# Patient Record
Sex: MALE | HISPANIC OR LATINO | URBAN - METROPOLITAN AREA
[De-identification: names, ages, dates, MRNs, and addresses within clinical notes are randomized per-mention and may not be internally consistent; named-entity substitution may affect disease eponyms.]

---

## 2023-11-13 ENCOUNTER — EMERGENCY (EMERGENCY)
Facility: HOSPITAL | Age: 23
LOS: 1 days | Discharge: HOME | End: 2023-11-13
Admitting: EMERGENCY MEDICINE
Payer: MEDICAID

## 2023-11-13 VITALS
TEMPERATURE: 98 F | HEART RATE: 85 BPM | SYSTOLIC BLOOD PRESSURE: 111 MMHG | DIASTOLIC BLOOD PRESSURE: 65 MMHG | OXYGEN SATURATION: 99 % | RESPIRATION RATE: 16 BRPM

## 2023-11-13 PROCEDURE — 99283 EMERGENCY DEPT VISIT LOW MDM: CPT

## 2023-11-13 NOTE — ED ADULT NURSE NOTE - OBJECTIVE STATEMENT
Pt is alert and orientedx4, ambulatory at baseline. Pt presents to the ED with B/L ear pain for month, denies hearing loss. Pt denies chest pain, shortness of breath, dyspnea on exertion, breathing is unlabored and even. Pt denies nausea, vomiting or diarrhea

## 2023-11-13 NOTE — ED PROVIDER NOTE - PATIENT PORTAL LINK FT
You can access the FollowMyHealth Patient Portal offered by [facility] by registering at the following website: [email or web address]. By joining [facility]’s FollowMyHealth portal, you will also be able to view your health information using other applications (apps) compatible with our system.

## 2023-11-13 NOTE — ED PROVIDER NOTE - NSFOLLOWUPINSTRUCTIONS_ED_ALL_ED_FT
HEARING LOSS - AfterCare(R) Instructions(ER/ED)     Hearing Loss    WHAT YOU NEED TO KNOW:  Hearing loss means you have trouble hearing or you cannot hear at all in one or both ears. Hearing loss can happen suddenly or slowly over time.     DISCHARGE INSTRUCTIONS:  Return to the emergency department if:   You have fluid, pus, or blood leaking from your ear.  You have sudden, severe hearing loss.   Contact your healthcare provider if:   You have a fever.  You have ear pain that is getting worse.  You have ringing in your ears or dizziness that will not go away.  You have questions or concerns about your condition or care.    Manage your hearing loss:   Protect your hearing. Use ear plugs or ear protectors if you do activities that are very loud. These include using a lawnmower and power tools or going to a concert that has loud music. Use well-fitting foam earplugs that completely block your ear canal. Do not listen to loud music through headphones or earphones.  If you have hearing aids, wear them regularly. Talk to your healthcare provider if you are having trouble using your hearing aid.   Ask about cochlear implants. If hearing aids do not help you, talk to your healthcare provider. Cochlear implants may help you hear better. A cochlear implant is a tiny device that is put into your cochlea (part of your inner ear) during surgery.   Assistive listening devices (ALDs) pic up sound and send it through earphones or a headset. ALDs can help you hear better when you are in a place with background noise. Examples include theaters, classrooms, or auditoriums. ALDs are also available for phones. ALDs can be used alone or with hearing aids or cochlear implants.  Tell people that you have hearing loss. Ask people to face you directly when they speak to you, and to slow down if they are speaking too fast. When you are in a group setting, sit in a location where you can clearly see the faces of the people who are speaking. Ask people not to speak loudly or shout when they are speaking to you. Try to talk with others in a quiet place. Background noise makes it harder for you to hear.  Pay close attention to your surroundings when you drive. Do not talk to people in your car while you are driving. Watch for problems on the road or approaching emergency vehicles.    Follow up with your healthcare provider or audiologist as directed: Write down your questions so you remember to ask them during your visits.

## 2023-11-13 NOTE — ED PROVIDER NOTE - OBJECTIVE STATEMENT
24 y/o male with no pmh presents with b/l ear pain, tinnitus, and recent associated hearing loss x 2 weeks. Patient reports h/o tinnitus for 4-5 years which he takes OTC Flavonoids. Admits to excessive headphone use. One year ago patient reports fire alarm caused increased b/l ear sensitivity prompting daily use of ear buds.  Admits to one episode of bleeding from R ear after sneezing. Denies seeking medical attention in the past for tinnitus, h/a, vision changes, vertigo, light-headedness ,recent abx use. 24 y/o male with no pmh presents with b/l ear pain, tinnitus, and recent associated hearing loss x 2 weeks. Patient reports h/o tinnitus for 4-5 years which he takes OTC Flavonoids. Admits to excessive headphone use. One year ago patient reports fire alarm caused increased b/l ear sensitivity prompting daily use of ear buds.  Admits to one episode of bleeding from R ear after sneezing 3 weeks ago which resolved. Denies seeking medical attention in the past for tinnitus, h/a, vision changes, vertigo, light-headedness ,recent abx use.

## 2023-11-13 NOTE — ED PROVIDER NOTE - CLINICAL SUMMARY MEDICAL DECISION MAKING FREE TEXT BOX
24 y/o male with no pmh presents with b/l ear pain, tinnitus, and recent  hearing loss two weeks ago.   -Tinnitus vs functional hearing loss  -Will refer patient to outpatient  ENT for further workup. No urgent consult or imaging needed in the ER 24 y/o male with no pmh presents with b/l ear pain, tinnitus, and recent  hearing loss two weeks ago.   -Tinnitus vs functional hearing loss - does not warrant imaging or urgent ENT eval in ER  -Will refer patient to outpatient  ENT for further work up.

## 2023-11-13 NOTE — ED PROVIDER NOTE - ENMT, MLM
Airway patent, Nasal mucosa clear. Mouth with normal mucosa. Throat has no vesicles, no oropharyngeal exudates and uvula is midline. Erythematous b/l external ear canals. Normal tympanic membrane without bulging or retraction.

## 2023-12-12 ENCOUNTER — NON-APPOINTMENT (OUTPATIENT)
Age: 23
End: 2023-12-12

## 2023-12-13 ENCOUNTER — APPOINTMENT (OUTPATIENT)
Dept: OTOLARYNGOLOGY | Facility: CLINIC | Age: 23
End: 2023-12-13
Payer: MEDICAID

## 2023-12-13 ENCOUNTER — TRANSCRIPTION ENCOUNTER (OUTPATIENT)
Age: 23
End: 2023-12-13

## 2023-12-13 VITALS
TEMPERATURE: 91 F | WEIGHT: 148 LBS | DIASTOLIC BLOOD PRESSURE: 71 MMHG | SYSTOLIC BLOOD PRESSURE: 113 MMHG | BODY MASS INDEX: 23.23 KG/M2 | HEIGHT: 67 IN

## 2023-12-13 VITALS — HEART RATE: 91 BPM | TEMPERATURE: 97.8 F

## 2023-12-13 PROCEDURE — 92557 COMPREHENSIVE HEARING TEST: CPT

## 2023-12-13 PROCEDURE — 99203 OFFICE O/P NEW LOW 30 MIN: CPT | Mod: 25

## 2023-12-13 PROCEDURE — 92625 TINNITUS ASSESSMENT: CPT

## 2023-12-13 PROCEDURE — 92567 TYMPANOMETRY: CPT

## 2023-12-19 NOTE — ASSESSMENT
[FreeTextEntry1] : 23Y M present with bilateral tinnitus with hyperacusis  Personally reviewed Audiogram shows AU Hearing -8k hz. AU Tymp A   Physical exam shows bilateral ears normal EAC/TM  Recommend Bilateral Tinnitus  with Hyperacusis -Discussed that Hearing Aids may help with relieving some of the tinnitus. Tinnitus usually follows the same pattern of hearing loss and can be attributed to phantom sounds in the brain filling in for the loss of hearing in those particular frequencies -Discussed that Tinnitus usually can be attributed to phantom sounds in the brain filling in for sounds. If the tinnitus is brief only last for a few seconds with no loss of hearing associated. It is usually physiological and can be management conservatively  -Discussed notched therapy, masking therapy, cognitive behavioral therapy, factors that may influence tinnitus, and discussed limited benefit of tinnitus supplements. -Tinnitus Hand Out Given -Patient states will try white noise machine and Audionotch for sound therapy [email or web address] -Gave website for Cognitive Behavior Therapist Registry  [email or web address] -Referral to Dr. Jazmin Grossman for additional evaluation if patient if symptoms are not improving  -Return to clinic in 3 months or sooner if new/worsen symptoms present

## 2023-12-19 NOTE — REASON FOR VISIT
[Initial Evaluation] : an initial evaluation for [FreeTextEntry2] : tinnitus and hearing sensitivity

## 2023-12-19 NOTE — HISTORY OF PRESENT ILLNESS
[de-identified] : 23 year old male presents for evaluation of tinnitus and hearing sensitivity. Patient states he has a 3 year history of tinnitus. Describes sound as "crickets".  Taking Lipo-Flavonoid PRN for the past 2 years with temporary improvement. Also with concerns for hearing sensitivity which began almost 1 year ago. Non pulsatile  Reports otalgia with sounds, currently wearing noise cancelling headphones. Denies otorrhea, history of ear infections and dizziness.

## 2023-12-19 NOTE — DATA REVIEWED
[de-identified] : An audiogram was ordered and performed including pure tones, tympanometry and speech testing for the patients complaint of hearing loss I have independently reviewed the patient's audiogram from today and my findings include  AU Hearing -8k hz. AU Tymp A

## 2024-01-31 ENCOUNTER — NON-APPOINTMENT (OUTPATIENT)
Age: 24
End: 2024-01-31

## 2024-02-01 ENCOUNTER — APPOINTMENT (OUTPATIENT)
Dept: OTOLARYNGOLOGY | Facility: CLINIC | Age: 24
End: 2024-02-01
Payer: MEDICAID

## 2024-02-01 VITALS — HEIGHT: 67 IN | BODY MASS INDEX: 23.23 KG/M2 | WEIGHT: 148 LBS

## 2024-02-01 DIAGNOSIS — H93.233 HYPERACUSIS, BILATERAL: ICD-10-CM

## 2024-02-01 DIAGNOSIS — H93.293 OTHER ABNORMAL AUDITORY PERCEPTIONS, BILATERAL: ICD-10-CM

## 2024-02-01 DIAGNOSIS — H93.13 TINNITUS, BILATERAL: ICD-10-CM

## 2024-02-01 PROCEDURE — 99213 OFFICE O/P EST LOW 20 MIN: CPT | Mod: 25

## 2024-02-01 PROCEDURE — 92504 EAR MICROSCOPY EXAMINATION: CPT

## 2024-02-01 RX ORDER — NORTRIPTYLINE HYDROCHLORIDE 10 MG/1
10 CAPSULE ORAL
Qty: 60 | Refills: 2 | Status: ACTIVE | COMMUNITY
Start: 2024-02-01 | End: 1900-01-01

## 2024-02-01 NOTE — PROCEDURE
[Same] : same as the Pre Op Dx. [] : Binocular Microscopy [FreeTextEntry1] : tinnitus [FreeTextEntry4] : none [FreeTextEntry6] : Operative microscope was used to examine the ear canal, ear drum and visible middle ear landmarks. Adequate exam would not have been possible without the use of a microscope. Findings are described.

## 2024-02-01 NOTE — DATA REVIEWED
[de-identified] : I have independently reviewed the patient's audiogram from december and my findings include normal hearing

## 2024-02-01 NOTE — HISTORY OF PRESENT ILLNESS
[de-identified] : 24 year old male referred by Dr. Shell presents for tinnitus audio 12/2023 Patient states he has a 3 year history of tinnitus. Describes sound as "crickets". Taking Lipo-Flavonoid PRN for the past 2 years with temporary improvement. Ear becomes wet when he takes med Also with concerns for hearing sensitivity which began almost 1 year ago. Non pulsatile Reports otalgia with sounds, currently wearing noise cancelling headphones. Denies otorrhea, history of ear infections and dizziness.  Only loud noise exposure he can think of is his nephew screaming

## 2024-02-01 NOTE — PHYSICAL EXAM
[Binocular Microscopic Exam] : Binocular microscopic exam was performed [Hearing Coffey Test (Tuning Fork On Forehead)] : no lateralization of tone [Hearing Loss Right Only] : normal [Hearing Loss Left Only] : normal [Rinne Test Air Conduction Persists > Bone Conduction Right] : bone conduction greater than air conduction on the right [Rinne Test Air Conduction Persists > Bone Conduction Left] : bone conduction greater than air conduction on the left [Nystagmus] : ~T no ~M nystagmus was seen [Fukuda Step Test] : Fukuda Step Test was Negative [Romberg's Sign] : Romberg's sign was absent [Fistula Sign] : Fistula Sign: Negative [Past-Pointing] : Past-Pointing: Negative [Emiliano-Hall[place]itoke] : [place]-Hallpike: Negative [Normal] : no rashes

## 2024-03-28 ENCOUNTER — APPOINTMENT (OUTPATIENT)
Dept: OTOLARYNGOLOGY | Facility: CLINIC | Age: 24
End: 2024-03-28
